# Patient Record
Sex: FEMALE | Race: WHITE | HISPANIC OR LATINO | Employment: UNEMPLOYED | URBAN - METROPOLITAN AREA
[De-identification: names, ages, dates, MRNs, and addresses within clinical notes are randomized per-mention and may not be internally consistent; named-entity substitution may affect disease eponyms.]

---

## 2017-03-16 ENCOUNTER — ALLSCRIPTS OFFICE VISIT (OUTPATIENT)
Dept: OTHER | Facility: OTHER | Age: 52
End: 2017-03-16

## 2017-03-16 DIAGNOSIS — F41.9 ANXIETY DISORDER: ICD-10-CM

## 2017-03-16 DIAGNOSIS — E66.9 OBESITY: ICD-10-CM

## 2017-03-16 DIAGNOSIS — Z00.00 ENCOUNTER FOR GENERAL ADULT MEDICAL EXAMINATION WITHOUT ABNORMAL FINDINGS: ICD-10-CM

## 2017-03-16 DIAGNOSIS — E55.9 VITAMIN D DEFICIENCY: ICD-10-CM

## 2017-03-16 DIAGNOSIS — N25.81 SECONDARY HYPERPARATHYROIDISM OF RENAL ORIGIN (HCC): ICD-10-CM

## 2017-03-16 DIAGNOSIS — I10 ESSENTIAL (PRIMARY) HYPERTENSION: ICD-10-CM

## 2017-03-16 DIAGNOSIS — Z98.84 BARIATRIC SURGERY STATUS: ICD-10-CM

## 2017-03-16 DIAGNOSIS — K91.2 POSTSURGICAL MALABSORPTION, NOT ELSEWHERE CLASSIFIED: ICD-10-CM

## 2017-03-16 DIAGNOSIS — E53.8 DEFICIENCY OF OTHER SPECIFIED B GROUP VITAMINS: ICD-10-CM

## 2017-04-12 ENCOUNTER — APPOINTMENT (OUTPATIENT)
Dept: LAB | Facility: HOSPITAL | Age: 52
End: 2017-04-12
Payer: COMMERCIAL

## 2017-04-12 ENCOUNTER — TRANSCRIBE ORDERS (OUTPATIENT)
Dept: ADMINISTRATIVE | Facility: HOSPITAL | Age: 52
End: 2017-04-12

## 2017-04-12 DIAGNOSIS — K91.2 POSTSURGICAL MALABSORPTION, NOT ELSEWHERE CLASSIFIED: ICD-10-CM

## 2017-04-12 DIAGNOSIS — I10 ESSENTIAL HYPERTENSION, MALIGNANT: ICD-10-CM

## 2017-04-12 DIAGNOSIS — E03.9 UNSPECIFIED HYPOTHYROIDISM: ICD-10-CM

## 2017-04-12 DIAGNOSIS — E55.9 VITAMIN D DEFICIENCY: ICD-10-CM

## 2017-04-12 DIAGNOSIS — I10 ESSENTIAL (PRIMARY) HYPERTENSION: ICD-10-CM

## 2017-04-12 DIAGNOSIS — E66.09 EXOGENOUS OBESITY: ICD-10-CM

## 2017-04-12 DIAGNOSIS — Z98.84 BARIATRIC SURGERY STATUS: ICD-10-CM

## 2017-04-12 DIAGNOSIS — Z79.899 ENCOUNTER FOR LONG-TERM (CURRENT) USE OF OTHER MEDICATIONS: ICD-10-CM

## 2017-04-12 DIAGNOSIS — E66.9 OBESITY: ICD-10-CM

## 2017-04-12 DIAGNOSIS — K59.09 OTHER CONSTIPATION: ICD-10-CM

## 2017-04-12 DIAGNOSIS — E55.9 UNSPECIFIED VITAMIN D DEFICIENCY: ICD-10-CM

## 2017-04-12 DIAGNOSIS — E03.9 UNSPECIFIED HYPOTHYROIDISM: Primary | ICD-10-CM

## 2017-04-12 DIAGNOSIS — F41.9 ANXIETY DISORDER: ICD-10-CM

## 2017-04-12 DIAGNOSIS — Z96.652 PRESENCE OF LEFT ARTIFICIAL KNEE JOINT: ICD-10-CM

## 2017-04-12 DIAGNOSIS — Z96.652 PRESENCE OF LEFT ARTIFICIAL KNEE JOINT: Primary | ICD-10-CM

## 2017-04-12 DIAGNOSIS — N25.81 SECONDARY HYPERPARATHYROIDISM OF RENAL ORIGIN (HCC): ICD-10-CM

## 2017-04-12 DIAGNOSIS — Z00.00 ENCOUNTER FOR GENERAL ADULT MEDICAL EXAMINATION WITHOUT ABNORMAL FINDINGS: ICD-10-CM

## 2017-04-12 DIAGNOSIS — E53.8 DEFICIENCY OF OTHER SPECIFIED B GROUP VITAMINS: ICD-10-CM

## 2017-04-12 LAB
25(OH)D3 SERPL-MCNC: 19.3 NG/ML (ref 30–100)
ALBUMIN SERPL BCP-MCNC: 3.9 G/DL (ref 3.5–5)
ALP SERPL-CCNC: 91 U/L (ref 46–116)
ALT SERPL W P-5'-P-CCNC: 18 U/L (ref 12–78)
ANION GAP SERPL CALCULATED.3IONS-SCNC: 6 MMOL/L (ref 4–13)
AST SERPL W P-5'-P-CCNC: 26 U/L (ref 5–45)
BASOPHILS # BLD AUTO: 0.03 THOUSANDS/ΜL (ref 0–0.1)
BASOPHILS NFR BLD AUTO: 1 % (ref 0–1)
BILIRUB SERPL-MCNC: 0.39 MG/DL (ref 0.2–1)
BUN SERPL-MCNC: 17 MG/DL (ref 5–25)
CALCIUM SERPL-MCNC: 8.9 MG/DL (ref 8.3–10.1)
CHLORIDE SERPL-SCNC: 106 MMOL/L (ref 100–108)
CHOLEST SERPL-MCNC: 166 MG/DL (ref 50–200)
CO2 SERPL-SCNC: 30 MMOL/L (ref 21–32)
CREAT SERPL-MCNC: 0.71 MG/DL (ref 0.6–1.3)
CRP SERPL QL: <3 MG/L
EOSINOPHIL # BLD AUTO: 0.21 THOUSAND/ΜL (ref 0–0.61)
EOSINOPHIL NFR BLD AUTO: 4 % (ref 0–6)
ERYTHROCYTE [DISTWIDTH] IN BLOOD BY AUTOMATED COUNT: 13.2 % (ref 11.6–15.1)
ERYTHROCYTE [SEDIMENTATION RATE] IN BLOOD: 6 MM/HOUR (ref 0–20)
FERRITIN SERPL-MCNC: 30 NG/ML (ref 8–388)
FOLATE SERPL-MCNC: 10.8 NG/ML (ref 3.1–17.5)
GFR SERPL CREATININE-BSD FRML MDRD: >60 ML/MIN/1.73SQ M
GLUCOSE P FAST SERPL-MCNC: 89 MG/DL (ref 65–99)
HCT VFR BLD AUTO: 40.9 % (ref 34.8–46.1)
HDLC SERPL-MCNC: 99 MG/DL (ref 40–60)
HGB BLD-MCNC: 13.7 G/DL (ref 11.5–15.4)
LDLC SERPL CALC-MCNC: 55 MG/DL (ref 0–100)
LYMPHOCYTES # BLD AUTO: 1.43 THOUSANDS/ΜL (ref 0.6–4.47)
LYMPHOCYTES NFR BLD AUTO: 27 % (ref 14–44)
MCH RBC QN AUTO: 28.4 PG (ref 26.8–34.3)
MCHC RBC AUTO-ENTMCNC: 33.5 G/DL (ref 31.4–37.4)
MCV RBC AUTO: 85 FL (ref 82–98)
MONOCYTES # BLD AUTO: 0.32 THOUSAND/ΜL (ref 0.17–1.22)
MONOCYTES NFR BLD AUTO: 6 % (ref 4–12)
NEUTROPHILS # BLD AUTO: 3.3 THOUSANDS/ΜL (ref 1.85–7.62)
NEUTS SEG NFR BLD AUTO: 62 % (ref 43–75)
PLATELET # BLD AUTO: 225 THOUSANDS/UL (ref 149–390)
PMV BLD AUTO: 10.2 FL (ref 8.9–12.7)
POTASSIUM SERPL-SCNC: 3.9 MMOL/L (ref 3.5–5.3)
PROT SERPL-MCNC: 7.1 G/DL (ref 6.4–8.2)
PTH-INTACT SERPL-MCNC: 142.2 PG/ML (ref 14–72)
RBC # BLD AUTO: 4.83 MILLION/UL (ref 3.81–5.12)
SODIUM SERPL-SCNC: 142 MMOL/L (ref 136–145)
T4 FREE SERPL-MCNC: 1 NG/DL (ref 0.76–1.46)
TRIGL SERPL-MCNC: 58 MG/DL
TSH SERPL DL<=0.05 MIU/L-ACNC: 3.84 UIU/ML (ref 0.36–3.74)
VIT B12 SERPL-MCNC: 279 PG/ML (ref 100–900)
WBC # BLD AUTO: 5.29 THOUSAND/UL (ref 4.31–10.16)

## 2017-04-12 PROCEDURE — 82607 VITAMIN B-12: CPT

## 2017-04-12 PROCEDURE — 85652 RBC SED RATE AUTOMATED: CPT

## 2017-04-12 PROCEDURE — 84590 ASSAY OF VITAMIN A: CPT

## 2017-04-12 PROCEDURE — 82746 ASSAY OF FOLIC ACID SERUM: CPT

## 2017-04-12 PROCEDURE — 85025 COMPLETE CBC W/AUTO DIFF WBC: CPT

## 2017-04-12 PROCEDURE — 84425 ASSAY OF VITAMIN B-1: CPT

## 2017-04-12 PROCEDURE — 80061 LIPID PANEL: CPT

## 2017-04-12 PROCEDURE — 82728 ASSAY OF FERRITIN: CPT

## 2017-04-12 PROCEDURE — 84443 ASSAY THYROID STIM HORMONE: CPT

## 2017-04-12 PROCEDURE — 36415 COLL VENOUS BLD VENIPUNCTURE: CPT

## 2017-04-12 PROCEDURE — 84439 ASSAY OF FREE THYROXINE: CPT

## 2017-04-12 PROCEDURE — 83970 ASSAY OF PARATHORMONE: CPT

## 2017-04-12 PROCEDURE — 80053 COMPREHEN METABOLIC PANEL: CPT

## 2017-04-12 PROCEDURE — 82306 VITAMIN D 25 HYDROXY: CPT

## 2017-04-12 PROCEDURE — 86140 C-REACTIVE PROTEIN: CPT

## 2017-04-15 LAB
VIT A SERPL-MCNC: 35 UG/DL (ref 20–65)
VIT B1 BLD-SCNC: 161.5 NMOL/L (ref 66.5–200)

## 2017-05-08 ENCOUNTER — GENERIC CONVERSION - ENCOUNTER (OUTPATIENT)
Dept: OTHER | Facility: OTHER | Age: 52
End: 2017-05-08

## 2017-10-30 ENCOUNTER — GENERIC CONVERSION - ENCOUNTER (OUTPATIENT)
Dept: OTHER | Facility: OTHER | Age: 52
End: 2017-10-30

## 2017-10-30 ENCOUNTER — TRANSCRIBE ORDERS (OUTPATIENT)
Dept: ADMINISTRATIVE | Facility: HOSPITAL | Age: 52
End: 2017-10-30

## 2017-10-30 DIAGNOSIS — R10.13 EPIGASTRIC PAIN: Primary | ICD-10-CM

## 2017-10-30 DIAGNOSIS — R14.0 BLOATING: ICD-10-CM

## 2017-11-09 DIAGNOSIS — R14.0 ABDOMINAL DISTENSION (GASEOUS): ICD-10-CM

## 2017-11-09 DIAGNOSIS — Z98.84 BARIATRIC SURGERY STATUS: ICD-10-CM

## 2017-11-10 ENCOUNTER — APPOINTMENT (OUTPATIENT)
Dept: LAB | Facility: HOSPITAL | Age: 52
End: 2017-11-10
Payer: COMMERCIAL

## 2017-11-10 ENCOUNTER — HOSPITAL ENCOUNTER (OUTPATIENT)
Dept: CT IMAGING | Facility: HOSPITAL | Age: 52
Discharge: HOME | End: 2017-11-10
Payer: COMMERCIAL

## 2017-11-10 DIAGNOSIS — Z98.84 BARIATRIC SURGERY STATUS: ICD-10-CM

## 2017-11-10 DIAGNOSIS — R14.0 ABDOMINAL DISTENSION (GASEOUS): ICD-10-CM

## 2017-11-10 DIAGNOSIS — R10.13 EPIGASTRIC PAIN: ICD-10-CM

## 2017-11-10 DIAGNOSIS — R14.0 BLOATING: ICD-10-CM

## 2017-11-10 LAB
ANION GAP SERPL CALCULATED.3IONS-SCNC: 5 MMOL/L (ref 4–13)
BUN SERPL-MCNC: 23 MG/DL (ref 5–25)
CALCIUM SERPL-MCNC: 9 MG/DL (ref 8.3–10.1)
CHLORIDE SERPL-SCNC: 104 MMOL/L (ref 100–108)
CO2 SERPL-SCNC: 31 MMOL/L (ref 21–32)
CREAT SERPL-MCNC: 0.68 MG/DL (ref 0.6–1.3)
GFR SERPL CREATININE-BSD FRML MDRD: 102 ML/MIN/1.73SQ M
GLUCOSE SERPL-MCNC: 129 MG/DL (ref 65–140)
POTASSIUM SERPL-SCNC: 3.9 MMOL/L (ref 3.5–5.3)
SODIUM SERPL-SCNC: 140 MMOL/L (ref 136–145)

## 2017-11-10 PROCEDURE — 80048 BASIC METABOLIC PNL TOTAL CA: CPT

## 2017-11-10 PROCEDURE — 74177 CT ABD & PELVIS W/CONTRAST: CPT

## 2017-11-10 PROCEDURE — 36415 COLL VENOUS BLD VENIPUNCTURE: CPT

## 2017-11-10 RX ADMIN — IOHEXOL 100 ML: 350 INJECTION, SOLUTION INTRAVENOUS at 12:18

## 2017-11-13 ENCOUNTER — GENERIC CONVERSION - ENCOUNTER (OUTPATIENT)
Dept: OTHER | Facility: OTHER | Age: 52
End: 2017-11-13

## 2017-11-16 ENCOUNTER — GENERIC CONVERSION - ENCOUNTER (OUTPATIENT)
Dept: OTHER | Facility: OTHER | Age: 52
End: 2017-11-16

## 2017-11-27 ENCOUNTER — ANESTHESIA EVENT (OUTPATIENT)
Dept: GASTROENTEROLOGY | Facility: HOSPITAL | Age: 52
End: 2017-11-27
Payer: COMMERCIAL

## 2017-11-29 ENCOUNTER — HOSPITAL ENCOUNTER (OUTPATIENT)
Facility: HOSPITAL | Age: 52
Setting detail: OUTPATIENT SURGERY
Discharge: HOME | End: 2017-11-29
Attending: SURGERY | Admitting: SURGERY
Payer: COMMERCIAL

## 2017-11-29 ENCOUNTER — ANESTHESIA (OUTPATIENT)
Dept: GASTROENTEROLOGY | Facility: HOSPITAL | Age: 52
End: 2017-11-29
Payer: COMMERCIAL

## 2017-11-29 VITALS
RESPIRATION RATE: 16 BRPM | OXYGEN SATURATION: 100 % | HEART RATE: 60 BPM | DIASTOLIC BLOOD PRESSURE: 69 MMHG | TEMPERATURE: 98.3 F | SYSTOLIC BLOOD PRESSURE: 145 MMHG

## 2017-11-29 DIAGNOSIS — Z98.84 BARIATRIC SURGERY STATUS: ICD-10-CM

## 2017-11-29 DIAGNOSIS — R10.13 EPIGASTRIC PAIN: ICD-10-CM

## 2017-11-29 PROCEDURE — 88342 IMHCHEM/IMCYTCHM 1ST ANTB: CPT | Performed by: SURGERY

## 2017-11-29 PROCEDURE — 88305 TISSUE EXAM BY PATHOLOGIST: CPT | Performed by: SURGERY

## 2017-11-29 RX ORDER — SODIUM CHLORIDE 9 MG/ML
125 INJECTION, SOLUTION INTRAVENOUS CONTINUOUS
Status: DISCONTINUED | OUTPATIENT
Start: 2017-11-29 | End: 2017-11-29 | Stop reason: HOSPADM

## 2017-11-29 RX ORDER — DULOXETIN HYDROCHLORIDE 30 MG/1
20 CAPSULE, DELAYED RELEASE ORAL DAILY
COMMUNITY
End: 2018-05-15

## 2017-11-29 RX ORDER — GABAPENTIN 100 MG/1
100 CAPSULE ORAL DAILY
COMMUNITY
End: 2018-05-15

## 2017-11-29 RX ORDER — PRAMIPEXOLE DIHYDROCHLORIDE 0.25 MG/1
0.25 TABLET ORAL 3 TIMES DAILY
Status: ON HOLD | COMMUNITY
End: 2018-06-06 | Stop reason: ALTCHOICE

## 2017-11-29 RX ORDER — NALOXONE HYDROCHLORIDE 4 MG/.1ML
SPRAY NASAL AS NEEDED
COMMUNITY

## 2017-11-29 RX ORDER — PANTOPRAZOLE SODIUM 20 MG/1
20 TABLET, DELAYED RELEASE ORAL DAILY
COMMUNITY
End: 2018-03-22 | Stop reason: SDUPTHER

## 2017-11-29 RX ORDER — PROPOFOL 10 MG/ML
INJECTION, EMULSION INTRAVENOUS AS NEEDED
Status: DISCONTINUED | OUTPATIENT
Start: 2017-11-29 | End: 2017-11-29 | Stop reason: SURG

## 2017-11-29 RX ORDER — MIRTAZAPINE 45 MG/1
45 TABLET, FILM COATED ORAL
COMMUNITY

## 2017-11-29 RX ORDER — GABAPENTIN 400 MG/1
400 CAPSULE ORAL
COMMUNITY
End: 2018-05-15

## 2017-11-29 RX ADMIN — PROPOFOL 100 MG: 10 INJECTION, EMULSION INTRAVENOUS at 08:50

## 2017-11-29 RX ADMIN — LIDOCAINE HYDROCHLORIDE 100 MG: 20 INJECTION, SOLUTION INTRAVENOUS at 08:50

## 2017-11-29 RX ADMIN — SODIUM CHLORIDE 125 ML/HR: 0.9 INJECTION, SOLUTION INTRAVENOUS at 07:40

## 2017-11-29 NOTE — ANESTHESIA PREPROCEDURE EVALUATION
Review of Systems/Medical History  Patient summary reviewed  Chart reviewed  History of anesthetic complications PONV    Cardiovascular  Hypertension controlled,    Pulmonary  Asthma: Last rescue: today Asthma type of rescue: chronic medication usage, ,        GI/Hepatic    GERD well controlled, Bariatric surgery (LRYGB 4 years ago),        Negative  ROS        Endo/Other     GYN  Negative gynecology ROS          Hematology   Musculoskeletal  Obesity , Back pain , chronic back pain and lumbar pain,        Neurology  Negative neurology ROS      Psychology   Anxiety, Depression , being treated for depression,            Physical Exam    Airway    Mallampati score: II  TM Distance: >3 FB  Neck ROM: full     Dental   No notable dental hx     Cardiovascular  Rhythm: regular, Rate: normal, Cardiovascular exam normal    Pulmonary  Pulmonary exam normal Breath sounds clear to auscultation,     Other Findings        Anesthesia Plan  ASA Score- 2       Anesthesia Type- IV sedation with anesthesia with ASA Monitors  Additional Monitors:   Airway Plan:           Induction- intravenous  Informed Consent- Anesthetic plan and risks discussed with patient

## 2017-11-29 NOTE — H&P
This is a 47 y o  female with a history of gastric bypass in 2012  She presents with 2-3 week history of epigastric abdominal pain  Here for an EGD to evaluate the anatomy of the GI tract  Physical Exam    AAOx3  RRR  CTA B  Abdomen obese  Benign  A/P:    This is a 47 y o  female with a history of gastric bypass in 2012  Here for an EGD to rule out a marginal ulceration  Efren Guido MD  11/29/17

## 2017-11-29 NOTE — OP NOTE
[facility]   [address] [facility], [address]  [phone] (Fax)      Operative Report  ESOPHAGOGASTRODUODENOSCOPY (EGD)     Patient Name: Meron Patel    :  1965  MRN: [id number]  Patient Location: [facility] ROOM 01  Surgery Date : 2017  Surgeons:  Surgeon(s) and Role:     * Charan Chowdhury MD - Primary    Diagnosis:    Pre-Op Diagnosis Codes:  Epigastric pain [R10 13]  Bariatric surgery status [Z98 84]    Post-Op Diagnosis Codes:     * Epigastric pain [R10 13]     * Bariatric surgery status [Z98 84]    Procedure  1  Endoscopy with Biopsy    Specimen(s):  ID Type Source Tests Collected by Time Destination   1 : bx stomach pouch, R/O H  Pylori Tissue Stomach TISSUE EXAM Charan Chowdhury MD 2017 [id number]        Estimated Blood Loss:  Minimal      Anesthesia Type:   IV Sedation with Anesthesia    Operative Indications:  Epigastric pain [R10 13]  Bariatric surgery status [Z98 84]    Operative Findings:    Normal gastric bypass anatomy    Complications:   None    Procedure and Technique:        Indication for the procedure     This is a 47 y o  female with a history of gastric bypass in   She presents with 2-3 week history of epigastric abdominal pain  Here for an EGD to evaluate the anatomy of the GI tract      Operative Technique     The patient was brought to the GI suite and was placed in a supine position  EKG trace, pulse, blood pressure and oxygen saturation were monitored throughout the procedure  A time out was called and the patient was identified by name and arm band  The patient was placed in a left lateral decubitus position and received IV sedation with propofol by the anesthesia staff  The endoscope was introduced through the mouth and advanced under under direct visualization  The esophagus was normal in appearance  The gastric pouch was normal in appearance    There was no evidence of marginal ulceration or anastomotic stricture  There was no evidence of gastrogastric fistula either  I was able to pass the scope into the Osmar limb without any difficulty and went down 70 cm distally not finding any abnormalities either  A biopsy was taken from the pouch times two with a forceps grasper to rule out the presence of H  Pylori  The GE junction was again inspected upon withdrawing the scope and was normal in appearance      Impression     Normal anatomy status post gastric bypass  No evidence of marginal ulceration or stricture        Patient Disposition:  PACU     Signature: Geoffrey Casanova MD  Date: November 29, 2017  Time: 8:58 AM

## 2018-01-03 ENCOUNTER — GENERIC CONVERSION - ENCOUNTER (OUTPATIENT)
Dept: OTHER | Facility: OTHER | Age: 53
End: 2018-01-03

## 2018-01-11 NOTE — RESULT NOTES
Discussion/Summary   April 2017 labs reviewed      Your vitamin B12 level is low at 279--goal after gastric surgery is above 400   Low levels can affect your nerve health  Low levels can also lead to anemia and fatigue  Do NOT take GUMMIE multivitamins because they do not contain enough B vitamins  Alcohol intakes can also lower Vitamin b12 levels  As a gastric surgery patient, it is recommended to avoid alcohol intakes  Recommend that you take  an EXTRA 500 mcg of vitamin B12 sublingually (under the tongue) per day  Please keep your routine office visit  If you do not have a scheduled routine appointment, please call the office to schedule it  Our office number is [phone]  If you have questions about your results, this will be discussed with you at your upcoming  visit  If you have gotten your most recent labs after your recent visit and have questions, please call the office  I look forward to seeing you at your next visit  Verified Results  (1) COMPREHENSIVE METABOLIC PANEL [id number] 34:60DJ Jazmin Massing     Test Name Result Flag Reference   SODIUM 142 mmol/L  136-145   POTASSIUM 3 9 mmol/L  3 5-5 3   CHLORIDE 106 mmol/L  100-108   CARBON DIOXIDE 30 mmol/L  21-32   ANION GAP (CALC) 6 mmol/L  4-13   BLOOD UREA NITROGEN 17 mg/dL  5-25   CREATININE 0 71 mg/dL  0 60-1 30   Standardized to IDMS reference method   CALCIUM 8 9 mg/dL  8 3-10 1   BILI, TOTAL 0 39 mg/dL  0 20-1 00   ALK PHOSPHATAS 91 U/L     ALT (SGPT) 18 U/L  12-78   AST(SGOT) 26 U/L  5-45   ALBUMIN 3 9 g/dL  3 5-5 0   TOTAL PROTEIN 7 1 g/dL  6 4-8 2   eGFR Non-African American      >60 0 ml/min/1 73sq m   - Patient Instructions: This is a fasting blood test  Water,black tea or black  coffee only after 9:00pm the night before test Drink 2 glasses of water the morning of test This bloodwork is non-fasting  Please drink two glasses of water morning   ofbloodwork    National Kidney Disease Education Program recommendations are as follows:  GFR calculation is accurate only with a steady state creatinine  Chronic Kidney disease less than 60 ml/min/1 73 sq  meters  Kidney failure less than 15 ml/min/1 73 sq  meters  GLUCOSE FASTING 89 mg/dL  65-99     (1) FERRITIN [id number] 10:30AM Gale Dickens     Test Name Result Flag Reference   FERRITIN 30 ng/mL  5-401     (1) FOLATE [id number] 10:30AM Gale Dickens     Test Name Result Flag Reference   FOLATE 10 8 ng/mL  3 1-17 5     (1) LIPID PANEL, FASTING 53Its9533 10:30AM Gale Dickens     Test Name Result Flag Reference   CHOLESTEROL 166 mg/dL     HDL,DIRECT 99 mg/dL H 40-60   Specimen collection should occur prior to Metamizole administration due to the potential for falsely depressed results  LDL CHOLESTEROL CALCULATED 55 mg/dL  0-100   - Patient Instructions: This is a fasting blood test  Water,black tea or black  coffee only after 9:00pm the night before test   Drink 2 glasses of water the morning of test     - Patient Instructions: This is a fasting blood test  Water,black tea or black  coffee only after 9:00pm the night before test Drink 2 glasses of water the morning of test This bloodwork is non-fasting  Please drink two glasses of water morning   ofbloodwork  Triglyceride:         Normal              <150 mg/dl       Borderline High    150-199 mg/dl       High               200-499 mg/dl       Very High          >499 mg/dl  Cholesterol:         Desirable        <200 mg/dl      Borderline High  200-239 mg/dl      High             >239 mg/dl  HDL Cholesterol:        High    >59 mg/dL      Low     <41 mg/dL  LDL CALCULATED:    This screening LDL is a calculated result  It does not have the accuracy of the Direct Measured LDL in the monitoring of patients with hyperlipidemia and/or statin therapy  Direct Measure LDL (CAT359) must be ordered separately in these patients     TRIGLYCERIDES 58 mg/dL  <=150   Specimen collection should occur prior to N-Acetylcysteine or Metamizole administration due to the potential for falsely depressed results       (1) PTH N-TERMINAL (INTACT) 12Apr2017 10:30AM Gee      Test Name Result Flag Reference   PARATHYROID HORMONE INTACT 142 2 pg/mL H 14 0-72 0     (1) VITAMIN A 12Apr2017 10:30AM Hellen Campbellmer Order Number: [id number]     Test Name Result Flag Reference   VITAMIN A 35 ug/dL  20 - 65   Performed at:  [address]  [id number]  : Derrell Greenwood MD, Phone:  [phone]     (1) VITAMIN B1, WHOLE BLOOD 12Apr2017 10:30AM Hellen Campbellmer Order Number: [id number]     Test Name Result Flag Reference   VITAMIN B1, WHOLE BLOOD 161 5 nmol/L  66 5 - 200 0   Performed at:  [address]  [id number]  : Derrell Greenwood MD, Phone:  [phone]     (1) VITAMIN B12 12Apr2017 10:30AM Gee Oh     Test Name Result Flag Reference   VITAMIN B12 279 pg/mL  100-900

## 2018-01-12 NOTE — MISCELLANEOUS
Message  Reviewed results of US of gallbladder with Dr Ernestina Nails get MRCP  I plan to cancel her EGD and follow-up with me  She was given phone number to schedule her MRI  When she gets appointment scheduled she was advised to call the office to let them know the date and then she needs to schedule follow-up with Dr Hernán Caballero  Advised if she has abdominal pain that worsens with fever she should go to [address] [address] ER as able for evaluation  CR      Signatures   Electronically signed by : SERJIO He;  Apr 28 2016  9:11AM EST                       (Author)

## 2018-01-13 VITALS
WEIGHT: 147 LBS | TEMPERATURE: 98.7 F | SYSTOLIC BLOOD PRESSURE: 140 MMHG | BODY MASS INDEX: 31.71 KG/M2 | HEIGHT: 57 IN | RESPIRATION RATE: 16 BRPM | HEART RATE: 66 BPM | DIASTOLIC BLOOD PRESSURE: 90 MMHG

## 2018-01-13 NOTE — MISCELLANEOUS
Provider Comments  Provider Comments:   Dear Dimple Mcdonald had a scheduled appointment at our office for 09/02/2016 but were unable to keep  We attempted to call you back but were unable to reach you  It is very important that you follow up with us so that we can assess your physical and nutritional safety after your surgery  Please call our office at [phone] to reschedule your appointment         Sincerely,     Kevin Patel [facility]          Signatures   Electronically signed by : Keny Gamez, ; Sep  2 2016 10:03AM EST                       (Author)

## 2018-01-13 NOTE — RESULT NOTES
Verified Results  (1) BASIC METABOLIC PROFILE [id number] 10:40AM Haley Ruth Order Number: [id number]     Test Name Result Flag Reference   GLUCOSE,RANDM 129 mg/dL     If the patient is fasting, the ADA then defines impaired fasting glucose as > 100 mg/dL and diabetes as > or equal to 123 mg/dL  Specimen collection should occur prior to Sulfasalazine administration due to the potential for falsely depressed results  Specimen collection should occur prior to Sulfapyridine administration due to the potential for falsely elevated results  SODIUM 140 mmol/L  136-145   POTASSIUM 3 9 mmol/L  3 5-5 3   CHLORIDE 104 mmol/L  100-108   CARBON DIOXIDE 31 mmol/L  21-32   ANION GAP (CALC) 5 mmol/L  4-13   BLOOD UREA NITROGEN 23 mg/dL  5-25   CREATININE 0 68 mg/dL  0 60-1 30   Standardized to IDMS reference method   CALCIUM 9 0 mg/dL  8 3-10 1   eGFR 102 ml/min/1 73sq [facility] Disease Education Program recommendations are as follows:  GFR calculation is accurate only with a steady state creatinine  Chronic Kidney disease less than 60 ml/min/1 73 sq  meters  Kidney failure less than 15 ml/min/1 73 sq  meters

## 2018-01-14 VITALS
DIASTOLIC BLOOD PRESSURE: 60 MMHG | SYSTOLIC BLOOD PRESSURE: 100 MMHG | BODY MASS INDEX: 31.82 KG/M2 | RESPIRATION RATE: 15 BRPM | HEART RATE: 60 BPM | TEMPERATURE: 98.6 F | HEIGHT: 57 IN | WEIGHT: 147.5 LBS

## 2018-01-15 NOTE — MISCELLANEOUS
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 9) (K83 9)   6  Epigastric abdominal pain (789 06) (R10 13)   7  Fatigue (780 79) (R53 83)   8  Heart burn (787 1) (R12)   9  Hypertension (401 9) (I10)   10  Intestinal malabsorption following gastrectomy (579 3) (K91 2,Z90 3)   11  Iron deficiency anemia (280 9) (D50 9)   12  Iron deficiency anemia secondary to inadequate dietary iron intake (280 1) (D50 8)   13  Low vitamin B12 level (266 2) (E53 8)   14  Pica (307 52) (F50 89)   15  RUQ abdominal pain (789 01) (R10 11)   16  Secondary hyperparathyroidism (588 81) (N25 81)   17  Status post gastric bypass for obesity (V45 86) (Z98 84)   18  Thyroid disorder (246 9) (E07 9)   19  Vitamin B12 deficiency (266 2) (E53 8)   20  Vitamin D deficiency (268 9) (E55 9)    Current Meds   1  Albuterol Sulfate (2 5 MG/3ML) 0 083% Inhalation Nebulization Solution; Therapy: (Eulis Senna) to Recorded   2  Biotin TABS; Therapy: (Anitra ) to Recorded   3  Calcium TABS; Therapy: (Eulis Senna) to Recorded   4  Carafate 1 GM/10ML Oral Suspension; TAKE 2 TEASPOONFULS (10ML) BY MOUTH   FOUR TIMES A DAY; Therapy: 21Apr2016 to (Evaluate:26Eve3334)  Requested for: 19PUR0523; Last   Rx:21Apr2016; Status: ACTIVE - Renewal Denied Ordered   5  Carafate TABS (Sucralfate); Therapy: (Recorded:49Gde0092) to Recorded   6  Flovent Diskus 250 MCG/BLIST Inhalation Aerosol Powder Breath Activated; Therapy: 58XEU6596 to (Evaluate:13Alz7948) Recorded   7   Flovent HFA 44 MCG/ACT Inhalation Aerosol; Therapy: (Nidhi Cuff) to Recorded   8  HydroCHLOROthiazide 12 5 MG Oral Capsule; Therapy: (Recorded:06Rut3867) to Recorded   9  HydrOXYzine HCl - 50 MG Oral Tablet; Therapy: 45PZZ5010 to (Evaluate:57Kpo1701) Recorded   10  Inderal 40 MG TABS (Propranolol HCl); Therapy: (Nidhi Cuff) to Recorded   11  Levothyroxine Sodium 50 MCG Oral Tablet; Therapy: 75MGD1136 to (Evaluate:20Bml0591) Recorded   12  Linzess 145 MCG Oral Capsule; Therapy: 34RPA0278 to (Ziggy Limber) Recorded   13  Lisinopril 40 MG Oral Tablet; Therapy: (Nidhi Cuff) to Recorded   14  Morphine Sulfate 15 MG Oral Tablet Recorded   15  Morphine Sulfate ER 60 MG Oral Tablet Extended Release; Therapy: 84JID0766 to (Evaluate:71Fzk5021) Recorded   16  Multiple Vitamins TABS; Therapy: (Nidhi Cuff) to Recorded   17  Naproxen 500 MG Oral Tablet; Therapy: 26LEI1506 to (Evaluate:58Bos2003) Recorded   18  Nitrofurantoin Macrocrystal 100 MG Oral Capsule; Therapy: 98XUS1319 to (Evaluate:05Tvq2914) Recorded   19  OxyCODONE HCl - 5 MG Oral Tablet; Therapy: (Recorded:17Mar2015) to Recorded   20  Pantoprazole Sodium 20 MG Oral Tablet Delayed Release; TAKE 1 TABLET TWICE    DAILY; Therapy: 39KPF4329 to (Skyla Costello)  Requested for: 85WMD3390; Last    Rx:09Nov2016 Ordered   21  PROzac 20 MG Oral Capsule (FLUoxetine HCl); Therapy: (Recorded:09Nov2016) to Recorded   22  Saphris 10 MG Sublingual Tablet Sublingual;    Therapy: (Recorded:17Mar2015) to Recorded   23  Singulair 10 MG Oral Tablet (Montelukast Sodium); Therapy: (Nidhi Cuff) to Recorded   24  Vitamin D (Ergocalciferol) 66523 UNIT Oral Capsule; Therapy: 49TDW2755 to (Evaluate:86Bax3356) Recorded   25  Vitamin D TABS; Therapy: (Recorded:21Mar2013) to Recorded    Allergies    1   Demerol TABS    Plan  Epigastric abdominal pain, Heart burn, RUQ abdominal pain, Status post gastric bypass  for obesity    · Carafate 1 GM/10ML Oral Suspension; TAKE 10 ML 4 TIMES DAILY    Signatures   Electronically signed by : Paco Franco, ; Dec 23 2016  8:21AM EST                       (Author)

## 2018-01-16 NOTE — RESULT NOTES
Dear Cornelia Castro,   Your test results have returned and are listed below:      Discussion/Summary  Your results show some abnormalities  Your thyroid stimulating hormone is elevated, indicating an underactive thyroid  Your parathyroid hormone is elevated and your vitamin D level is low, which can indicate that you are losing calcium from your bones  Recommend that you see an endocrinologist to address these issues  I have included  a list of endocrinologist with this letter for you to use to set up an appointment  To normalize  your vitamin D level up,  recommend that you take 50,000 IU of vitamin D3( Replesta) once per week for 8 weeks  Lucy Marisela is an over the counter product and a slip is enclosed for you to take to the pharmacy, or it can be purchased on line  In addition, you need to take 1200 to 1500 mg of calcium citrate per day, in divided doses of 500 to 600 mg each  After 8 weeks, switch to a maintenance dose of 4000 to 5000 IU of vitamin D3 per day and continue to take calcium daily  A lab slip is included to recheck your vitamin D, parathyroid level ( PTH) and calcium again in 3 months  Please keep your regularly scheduled follow-up appointment  If you do not have a follow-up scheduled, please call the office to schedule a follow-up visit  If you have any questions, please don't hesitate to call the office     Sincerely,      Signatures   Electronically signed by : LINDA Leach; Aug 26 2016 12:33PM EST                       (Author)    Electronically signed by : Chalmer Apley, M D ; Aug 26 2016  2:00PM EST                       (Validation)

## 2018-01-16 NOTE — MISCELLANEOUS
Message  called patient to discuss findings from CT of abdomen/pelvis  Will await return phone call  CR      Signatures   Electronically signed by : SERJIO Cummings; Nov 16 2017 12:50PM EST                       (Author)

## 2018-01-16 NOTE — RESULT NOTES
Verified Results  * CT ABDOMEN PELVIS W CONTRAST [id number] 10:36AM Petr Lose     Test Name Result Flag Reference   CT ABDOMEN PELVIS W CONTRAST (Report)     CT ABDOMEN AND PELVIS WITH IV CONTRAST     INDICATION: Epigastric pain, bloating following eating for several weeks, history of gastric bypass 4 years ago  COMPARISON: None  TECHNIQUE: CT examination of the abdomen and pelvis was performed  Reformatted images were created in axial, sagittal, and coronal planes  Radiation dose length product (DLP) for this visit: 383 mGy-cm   This examination, like all CT scans performed in the [facility], was performed utilizing techniques to minimize radiation dose exposure, including the use of iterative    reconstruction and automated exposure control  IV Contrast: 100 mL of iohexol (OMNIPAQUE)      Enteric Contrast: Enteric contrast was not administered  FINDINGS:     ABDOMEN     LOWER CHEST: No significant abnormalities identified in the lower chest      LIVER/BILIARY TREE: Unremarkable  GALLBLADDER: Surgically absent  SPLEEN: Unremarkable  PANCREAS: Unremarkable  ADRENAL GLANDS: Unremarkable  KIDNEYS/URETERS: Mild fullness to the right renal collecting system and proximal ureter, possibly related to a enlarged uterus with right-sided tilt  STOMACH AND BOWEL: Postsurgical changes of gastric bypass  No evidence of bowel obstruction  Moderate fecal load throughout the colon  APPENDIX: No findings to suggest appendicitis  ABDOMINOPELVIC CAVITY: No ascites or free intraperitoneal air  No lymphadenopathy  VESSELS: Unremarkable for patient's age  PELVIS     REPRODUCTIVE ORGANS: Mildly enlarged uterus with right-sided tilt, noting 2 cm enhancing region in the anterior fundus, possibly fibroid  URINARY BLADDER: Unremarkable       ABDOMINAL WALL/INGUINAL REGIONS: Surgical clips in the lower abdominal wall, possibly due to prior history of  section  OSSEOUS STRUCTURES: No acute fracture or destructive osseous lesion  Postsurgical changes of L5-S1 fusion  Mild to moderate multilevel degenerative disc disease  IMPRESSION:   1  No acute abdominopelvic findings  Postsurgical changes of gastric bypass and cholecystectomy  2  Enlarged uterus with enhancing 2 cm region in the anterior fundus, possibly fibroid  Pelvic ultrasound can be performed for further evaluation         Workstation performed: [id number]     Signed by:   Cornelius Holter,    11/15/17

## 2018-01-18 NOTE — RESULT NOTES
Dear [facility],   Your test results have returned and are listed below:      Results  (1) COMPREHENSIVE METABOLIC PANEL [id number] 62:80IO Tirso Garcia Kidney Disease Education Program recommendations are as follows:  GFR calculation is accurate only with a steady state creatinine  Chronic Kidney disease less than 60 ml/min/1 73 sq  meters  Kidney failure less than 15 ml/min/1 73 sq  meters  Test Name Result Flag Reference   GLUCOSE,RANDM 96 mg/dL     If the patient is fasting, the ADA then defines impaired fasting glucose as > 100 mg/dL and diabetes as > or equal to 123 mg/dL  SODIUM 144 mmol/L  136-145   POTASSIUM 3 5 mmol/L  3 5-5 3   CHLORIDE 106 mmol/L  100-108   CARBON DIOXIDE 30 mmol/L  21-32   ANION GAP (CALC) 8 mmol/L  4-13   BLOOD UREA NITROGEN 16 mg/dL  5-25   CREATININE 0 76 mg/dL  0 60-1 30   Standardized to IDMS reference method   CALCIUM 8 9 mg/dL  8 3-10 1   BILI, TOTAL 0 37 mg/dL  0 20-1 00   ALK PHOSPHATAS 79 U/L     ALT (SGPT) 16 U/L  12-78   AST(SGOT) 24 U/L  5-45   ALBUMIN 3 8 g/dL  3 5-5 0   TOTAL PROTEIN 6 7 g/dL  6 4-8 2   eGFR Non-African American      >60 0 ml/min/1 73sq m     (1) LIPID PANEL, FASTING [id number] 09:15AM Kimmy Garcia   Triglyceride:         Normal              <150 mg/dl       Borderline High    150-199 mg/dl       High               200-499 mg/dl       Very High          >499 mg/dl  Cholesterol:         Desirable        <200 mg/dl      Borderline High  200-239 mg/dl      High             >239 mg/dl  HDL Cholesterol:        High    >59 mg/dL      Low     <41 mg/dL  LDL CALCULATED:    This screening LDL is a calculated result  It does not have the accuracy of the Direct Measured LDL in the monitoring of patients with hyperlipidemia and/or statin therapy  Direct Measure LDL (SPJ177) must be ordered separately in these patients       Test Name Result Flag Reference   CHOLESTEROL 205 mg/dL H    HDL,DIRECT 93 mg/dL H 40-60   LDL CHOLESTEROL CALCULATED 99 mg/dL  0-100   TRIGLYCERIDES 65 mg/dL  <=150     (1) VITAMIN B12 [id number] 09:15AM Karime Garcia     Test Name Result Flag Reference   VITAMIN B12 329 pg/mL  100-900     (1) FERRITIN [id number] 09:15AM Veronique Bueno     Test Name Result Flag Reference   FERRITIN 61 ng/mL  8-388     (1) FOLATE [id number] 09:15AM Veronique Bueno     Test Name Result Flag Reference   FOLATE 14 7 ng/mL  3 1-17 5     (1) IRON SATURATION %, TIBC [id number] 09:15AM Karime Garcia     Test Name Result Flag Reference   IRON SATURATION 35 %     TOTAL IRON BINDING CAPACITY 298 ug/dL  250-450   IRON 104 ug/dL       (1) PTH N-TERMINAL (INTACT) [id number] 09:15AM Karime Garcia     Test Name Result Flag Reference   PARATHYROID HORMONE INTACT 139 7 pg/mL H 14 0-72 0     (1) VITAMIN D 25-HYDROXY [id number] 09:15AM Karime Garcia     Test Name Result Flag Reference   VIT D 25-HYDROX 9 9 ng/mL L 30 0-100 0       Plan  Health Maintenance, Malabsorption syndrome, Secondary  hyperparathyroidism, Status post gastric bypass for  obesity    · (1) CALCIUM; Status:Active; Requested for:[id number];    · (1) PTH N-TERMINAL (INTACT); Status:Active; Requested  for:[id number];    · (1) VITAMIN D 25-HYDROXY; Status:Active; Requested  for:[id number];     Discussion/Summary  Your results show some abnormalities  2/19/16 labs reviewed  Your total cholesterol is a little high at 205, but your good cholesterol is high at 93-so this looks good overall  Please also review these labs with your PCP when you have a routine visit  Your PTH is very high at 139 7  Your parathyroid hormone (PTH) is elevated, which can indicate that you are losing calcium from your bones  Recommend that you take   1500 mg of calcium per day  Calcium needs to be taken in divided doses of 500 to 600 mg each  A lab slip is enclosed to recheck your levels again in three months       Your vitamin D is also very low at 9 9-Your vitamin D level is low which can affect your bone health  Recommend that you take 50,000 IU of vitamin D3 (Replesta) twice per week for 8 weeks  Replesta is an over the counter product and a slip is enclosed to take to the pharmacy, or it can be purchased on line  If you cannot afford the Replesta, I can order vitamin d2 for you in the same dose-call and leave a message on our clinical line if you need this or talk to our nurse and I will order this for you  IMPORTANT  After 8 weeks, switch to a maintenance dose of 1203-2978 IU vitamin D3 per day (this includes the vitamin D in all your supplements combined) and continue to take calcium daily  Your level will be checked again in 3 months    Your vitamin b12 level is low at 329-we want this > 400 after gastric surgery-Your vitamin B12 level is low, which can affect your nerve health  Recommend that you take an extra  500 -1000mcg of vitamin B12 sublingually (under the tongue) per day  Alcohol intakes can lower B vitamin levels  As a gastric surgery patient it is recommended to avoid all alcohol  If you have any questions, please don't hesitate to call the office     Sincerely,      Signatures   Electronically signed by : Brayan Saenz, [facility]; Feb 19 2016  4:36PM EST                       (Author)    Electronically signed by : VICKI Daniel ; Feb 25 2016 11:51AM EST                       (Validation)

## 2018-01-23 NOTE — MISCELLANEOUS
Provider Comments  Provider Comments:   Dear Steve Santana had a scheduled appointment at our office for 01/03/2018 but were unable to keep  We attempted to call you back but were unable to reach you  It is very important that you follow up with us so that we can assess your physical and nutritional safety after your surgery  Please call our office at [phone] to reschedule your appointment         Sincerely,     Kevin Patel [facility]          Signatures   Electronically signed by : Marycruz Merchant, ; Fermín  3 2018 11:43AM EST                       (Author)

## 2018-03-12 RX ORDER — SUCRALFATE 1 G/1
1 TABLET ORAL 4 TIMES DAILY
COMMUNITY
Start: 2017-10-30 | End: 2018-05-17 | Stop reason: ALTCHOICE

## 2018-03-12 RX ORDER — BUTALBITAL/ASPIRIN/CAFFEINE 50-325-40
CAPSULE ORAL
COMMUNITY

## 2018-03-12 RX ORDER — ERGOCALCIFEROL 1.25 MG/1
CAPSULE ORAL
COMMUNITY
Start: 2016-11-03

## 2018-03-12 RX ORDER — HYDROXYZINE PAMOATE 50 MG/1
CAPSULE ORAL
COMMUNITY
Start: 2017-02-11

## 2018-03-12 RX ORDER — OXYCODONE HCL 5 MG/5 ML
SOLUTION, ORAL ORAL
COMMUNITY

## 2018-03-12 RX ORDER — FLUTICASONE PROPIONATE 44 UG/1
AEROSOL, METERED RESPIRATORY (INHALATION)
COMMUNITY

## 2018-03-22 DIAGNOSIS — Z98.84 STATUS POST GASTRIC BYPASS FOR OBESITY: Primary | ICD-10-CM

## 2018-03-22 RX ORDER — PANTOPRAZOLE SODIUM 20 MG/1
20 TABLET, DELAYED RELEASE ORAL DAILY
Qty: 30 TABLET | Refills: 1 | Status: SHIPPED | OUTPATIENT
Start: 2018-03-22 | End: 2018-05-15

## 2018-05-15 RX ORDER — FLUVOXAMINE MALEATE 100 MG
TABLET ORAL
COMMUNITY

## 2018-05-15 RX ORDER — BACLOFEN 20 MG/1
TABLET ORAL
COMMUNITY
Start: 2018-02-02

## 2018-05-15 RX ORDER — ESTRADIOL 2 MG/1
TABLET ORAL
COMMUNITY

## 2018-05-15 RX ORDER — ESOMEPRAZOLE MAGNESIUM 20 MG/1
FOR SUSPENSION ORAL
Status: ON HOLD | COMMUNITY
Start: 2018-05-04 | End: 2018-06-06 | Stop reason: ALTCHOICE

## 2018-05-17 ENCOUNTER — OFFICE VISIT (OUTPATIENT)
Dept: BARIATRICS | Facility: CLINIC | Age: 53
End: 2018-05-17
Payer: COMMERCIAL

## 2018-05-17 VITALS
HEIGHT: 57 IN | WEIGHT: 148 LBS | DIASTOLIC BLOOD PRESSURE: 90 MMHG | HEART RATE: 78 BPM | TEMPERATURE: 98.2 F | SYSTOLIC BLOOD PRESSURE: 126 MMHG | BODY MASS INDEX: 31.93 KG/M2

## 2018-05-17 DIAGNOSIS — I10 ESSENTIAL HYPERTENSION: ICD-10-CM

## 2018-05-17 DIAGNOSIS — E66.9 OBESITY (BMI 30-39.9): ICD-10-CM

## 2018-05-17 DIAGNOSIS — E53.8 LOW VITAMIN B12 LEVEL: ICD-10-CM

## 2018-05-17 DIAGNOSIS — Z98.84 BARIATRIC SURGERY STATUS: Primary | ICD-10-CM

## 2018-05-17 DIAGNOSIS — K91.2 POSTSURGICAL MALABSORPTION: ICD-10-CM

## 2018-05-17 DIAGNOSIS — R10.13 EPIGASTRIC ABDOMINAL PAIN: ICD-10-CM

## 2018-05-17 DIAGNOSIS — E55.9 VITAMIN D DEFICIENCY: ICD-10-CM

## 2018-05-17 DIAGNOSIS — N25.81 SECONDARY HYPERPARATHYROIDISM (HCC): ICD-10-CM

## 2018-05-17 PROBLEM — M25.562 CHRONIC PAIN OF BOTH KNEES: Status: ACTIVE | Noted: 2017-12-27

## 2018-05-17 PROBLEM — R13.13 CRICOPHARYNGEAL DYSPHAGIA: Status: ACTIVE | Noted: 2017-10-30

## 2018-05-17 PROBLEM — F51.04 PSYCHOPHYSIOLOGICAL INSOMNIA: Status: ACTIVE | Noted: 2017-10-25

## 2018-05-17 PROBLEM — M79.7 FIBROMYALGIA: Status: ACTIVE | Noted: 2017-08-11

## 2018-05-17 PROBLEM — Z79.891 LONG-TERM CURRENT USE OF OPIATE ANALGESIC: Status: ACTIVE | Noted: 2017-08-11

## 2018-05-17 PROBLEM — G89.4 CHRONIC PAIN ASSOCIATED WITH SIGNIFICANT PSYCHOSOCIAL DYSFUNCTION: Status: ACTIVE | Noted: 2018-02-05

## 2018-05-17 PROBLEM — R73.03 PREDIABETES: Status: ACTIVE | Noted: 2017-10-02

## 2018-05-17 PROBLEM — M25.561 CHRONIC PAIN OF BOTH KNEES: Status: ACTIVE | Noted: 2017-12-27

## 2018-05-17 PROBLEM — M54.16 RADICULOPATHY, LUMBAR REGION: Status: ACTIVE | Noted: 2017-09-19

## 2018-05-17 PROBLEM — G89.29 CHRONIC PAIN OF BOTH KNEES: Status: ACTIVE | Noted: 2017-12-27

## 2018-05-17 PROCEDURE — 99214 OFFICE O/P EST MOD 30 MIN: CPT | Performed by: PHYSICIAN ASSISTANT

## 2018-05-17 RX ORDER — SUCRALFATE ORAL 1 G/10ML
1 SUSPENSION ORAL 4 TIMES DAILY
Qty: 420 ML | Refills: 0 | Status: SHIPPED | OUTPATIENT
Start: 2018-05-17

## 2018-05-17 NOTE — ASSESSMENT & PLAN NOTE
-s/p Osmar-En-Y Gastric Bypass with Dr Hillery Duane on 2/13/2012  She also has had laparoscopic cholecystectomy by Dr Hillery Duane 6/17/2016  Overall doing Fairly well with weight loss-weight has been stable over the past couple of years    Initial:192 lb  Current: 148 lb  EWL: 68%  Ronni:  Current BMI is Body mass index is 32 03 kg/m²      Tolerating a regular diet-fair- with abdominal pain intermittently  Eating at least 60 grams of protein per day-yes  Following 30/60 minute rule with liquids-yes  Drinking at least 64 ounces of fluid per day-yes  Drinking carbonated beverages-no  Sufficient exercise-limited  Using NSAIDs regularly-no  Using nicotine-no/some second-hand smoke exposure by significant other  Using alcohol-no

## 2018-05-17 NOTE — ASSESSMENT & PLAN NOTE
pth has remained high -likely related to her non-compliance with supplements  Advised her of importance of taking all supplements as advised  She is already established with endocrinologist and notes she has appointment with Dr Ling Schaeffer later today  I  Will recheck her pth, calcium and Vitamin d with her routine labs now but advised I want her to review her pth levels with him today as she may need further evaluation of the gland as well  She verbalized understanding   I provided her with pth levels from last 3 readings to share with her endocrinologist

## 2018-05-17 NOTE — ASSESSMENT & PLAN NOTE
Clover Stands returns today for her routine annual follow-up  She is status post Osmar-en-y gastric byppsss surgery 2/13/12 by Dr Shirley Mendoza and has had laparoscopic cholecystectomy 5/17/16 by Dr Shirley Mendoza  She had CT of abdomen/pelvis at the end of October 2017 for complaints of abdominal pain and bloating and also had upper endoscopy in November 2017 for epigastric burning pain  CT of abdomen/pelvis was essentially unrevealing for acute cause of her pain but did show moderate stool  She had enlarged uterus with possible fibroid and did follow-up with GYN for same and reports she does have fibroid  She had upper endoscopy for the burning epigastric abdominal pain in November and results were within normal limits and biopsy was benign with no evidence of h pylori    She notes over the past couple months mid-epigastric burning  abdominal pain has returned  Her PCP switched her ppi to nexium which she is taking daily  I had given her carafate prior to her upper endoscopy and she reports she continues to take this but notes she has been swallowing the pills whole  She complains of "choking on her phlegm" with vomiting up phlegm around once /week  She also notes for around 4 days she held her other medication because even taking her medication she is complaining of burning pain -but generally this is worse with meals  She has discussed same with her pcp  No fever,chills, nausea or vomiting  She notes last night she had epigastric pain for around 2 hours  She has had problems with constipation in the past-on linzess and now notes her stools are looser  Her PCP has referred her to gastroenterologist but patient prefers that we evaluate her stomach  She has some second-hand smoke exposure from her significant other  She denies NSAID or steroid use  She denies alcohol  She does have some caffeine use-encouraged to limit this as well  She is on chronic pain medication      On exam she was tender to palpation to mid-epigastric region without rebound or guarding    PLAN; Advised to increase her nexium to twice daily  Advised on taking carafate as a slurry 4 times per day  Recommended she add  A daily probiotic  Will repeat upper endoscopy to rule out ulcer  She will be seen in the office a couple weeks after upper endoscopy is completed    I encouraged her to keep follow-up with GI as well    Advised on importance of avoidance of second-hand smoke

## 2018-05-17 NOTE — PROGRESS NOTES
Assessment/Plan:    Epigastric abdominal pain  Siria returns today for her routine annual follow-up  She is status post Osmar-en-y gastric byppsss surgery 2/13/12 by Dr Hernán Caballero and has had laparoscopic cholecystectomy 5/17/16 by Dr Hernán Caballero  She had CT of abdomen/pelvis at the end of October 2017 for complaints of abdominal pain and bloating and also had upper endoscopy in November 2017 for epigastric burning pain  CT of abdomen/pelvis was essentially unrevealing for acute cause of her pain but did show moderate stool  She had enlarged uterus with possible fibroid and did follow-up with GYN for same and reports she does have fibroid  She had upper endoscopy for the burning epigastric abdominal pain in November and results were within normal limits and biopsy was benign with no evidence of h pylori    She notes over the past couple months mid-epigastric burning  abdominal pain has returned  Her PCP switched her ppi to nexium which she is taking daily  I had given her carafate prior to her upper endoscopy and she reports she continues to take this but notes she has been swallowing the pills whole  She complains of "choking on her phlegm" with vomiting up phlegm around once /week  She also notes for around 4 days she held her other medication because even taking her medication she is complaining of burning pain -but generally this is worse with meals  She has discussed same with her pcp  No fever,chills, nausea or vomiting  She notes last night she had epigastric pain for around 2 hours  She has had problems with constipation in the past-on linzess and now notes her stools are looser  Her PCP has referred her to gastroenterologist but patient prefers that we evaluate her stomach  She has some second-hand smoke exposure from her significant other  She denies NSAID or steroid use  She denies alcohol  She does have some caffeine use-encouraged to limit this as well    She is on chronic pain medication  On exam she was tender to palpation to mid-epigastric region without rebound or guarding    PLAN; Advised to increase her nexium to twice daily  Advised on taking carafate as a slurry 4 times per day  Recommended she add  A daily probiotic  Will repeat upper endoscopy to rule out ulcer  She will be seen in the office a couple weeks after upper endoscopy is completed    I encouraged her to keep follow-up with GI as well  Advised on importance of avoidance of second-hand smoke        Bariatric surgery status  -s/p Osmar-En-Y Gastric Bypass with Dr Farnaz Marcial on 2/13/2012  She also has had laparoscopic cholecystectomy by Dr Farnaz Marcial 6/17/2016  Overall doing Fairly well with weight loss-weight has been stable over the past couple of years    Initial:192 lb  Current: 148 lb  EWL: 68%  Ronni:  Current BMI is Body mass index is 32 03 kg/m²      Tolerating a regular diet-fair- with abdominal pain intermittently  Eating at least 60 grams of protein per day-yes  Following 30/60 minute rule with liquids-yes  Drinking at least 64 ounces of fluid per day-yes  Drinking carbonated beverages-no  Sufficient exercise-limited  Using NSAIDs regularly-no  Using nicotine-no/some second-hand smoke exposure by significant other  Using alcohol-no    Postsurgical malabsorption  -At risk for malabsorption of vitamins/minerals secondary to malabsorption and restriction of intake from their procedure  -Currently taking adequate postop bariatric surgery vitamin supplementation-no  -Last set of bariatric labs completed on 4/17 and are not within acceptable limits   -Next set of bariatric labs ordered for approximately 2 weeks     She has been only taking one regular multivitamin supplement daily-advised on importance of adequate supplements to avoid deficiency and long-term associated medical problems    Recommended that she try one [employer] multivitamin/mineral supplement with 18 mg of iron and 500 mg calcium citrate with D three times per day      Secondary hyperparathyroidism ([facility] [address] )  pth has remained high -likely related to her non-compliance with supplements  Advised her of importance of taking all supplements as advised  She is already established with endocrinologist and notes she has appointment with Dr Verito Doran later today  I  Will recheck her pth, calcium and Vitamin d with her routine labs now but advised I want her to review her pth levels with him today as she may need further evaluation of the gland as well  She verbalized understanding  I provided her with pth levels from last 3 readings to share with her endocrinologist     Hypertension  She is ordered on antihypertensive medication  Advised on importance of taking medication as prescribed by her providers  Diastolic was high normal today  Followed by pcp    Low vitamin B12 level  Last vitamin B12 level was low-advised to trial SpectraLinear which contains extra vitamin [id number] await labs for further advice       Diagnoses and all orders for this visit:    Bariatric surgery status    Postsurgical malabsorption    Secondary hyperparathyroidism ([facility] [address] )    Vitamin D deficiency    Obesity (BMI 30-39  9)    Low vitamin B12 level    Epigastric abdominal pain  -     sucralfate (CARAFATE) 1 g/10 mL suspension; Take 10 mL (1 g total) by mouth 4 (four) times a day    Essential hypertension          Subjective:      Patient ID: Gustavo Christianson is a 47 y o  female  She is here for routine annual visit  She is complaining of mid-epigastric burning pain worse with meals/intermittent  No associated fever,chills or nausea  She vomits "phelgm" at times  She is prone to constipation-on linzess now and notes stools tend to be loose now  She notes she has follow-up with Gastroenterologist in June as well          The following portions of the patient's history were reviewed and updated as appropriate: allergies, current medications, past family history, past medical history, past social history, past surgical history and problem list     Review of Systems   Constitutional: Negative for chills, fever and unexpected weight change (weight is stable)  Respiratory: Negative for shortness of breath and wheezing  Cardiovascular: Negative for chest pain and palpitations  Gastrointestinal: Negative for abdominal pain, constipation, diarrhea, nausea and vomiting  Psychiatric/Behavioral: Suicidal ideas: no complait of anxiety or depression  Objective:      /90   Pulse 78   Temp 98 2 °F (36 8 °C)   Ht 4' 9" (1 448 m)   Wt 67 1 kg (148 lb)   BMI 32 03 kg/m²          Physical Exam   Constitutional: She is oriented to person, place, and time  She appears well-developed and well-nourished  HENT:   Mouth/Throat: Oropharynx is clear and moist    Eyes: Conjunctivae are normal  No scleral icterus  Cardiovascular: Normal rate and regular rhythm  Pulmonary/Chest: Effort normal and breath sounds normal    Abdominal: Soft  Bowel sounds are normal  She exhibits no distension  There is tenderness  There is no rebound and no guarding  No incisional hernias appreciated  + tenderness to palpation to mid-epigastric region   Musculoskeletal:   Normal gait   Neurological: She is alert and oriented to person, place, and time  Psychiatric: She has a normal mood and affect  Her behavior is normal  Judgment and thought content normal    Nursing note and vitals reviewed  GOALS:Maintain +/- Continued weight loss with good nutrition intakes    Normal vitamin and mineral levels  Exercise as tolerated    BARRIERS: none identified

## 2018-05-17 NOTE — ASSESSMENT & PLAN NOTE
She is ordered on antihypertensive medication  Advised on importance of taking medication as prescribed by her providers  Diastolic was high normal today    Followed by pcp

## 2018-05-17 NOTE — PATIENT INSTRUCTIONS
Take nexium 20 mg twice a day for now-before breakfast and before dinner day  Take the liquid carafate before meals and bedtime  Also keep follow-up with  gastroenterologist  We will schedule repeat upper endoscopy  And then make follow-up with Dr Munir Cabrera after upper endoscopy  Recommend that you add an over-the-counter probitotic-this is over-the-counter  Get one that has lactobacillus in it  Take once daily  Review previous parathyroid hormone levels with Dr Enrike Suresh may need to have this further evaluated by him  Get Gastroenterology evaluation as well    Follow-up in one year with me  We kindly ask that you arrive 15 minutes before your scheduled appointment time with your provider to allow you to be roomed, have your vital signs checked and your chart updated by our staff  We appreciate your patience during your visit    Follow diet as discussed  Follow  vitamin and mineral recommendations as reviewed with you  Exercise as tolerated    If you have gotten a lab slip at this visit, please note that most labs are FASTING-but you need to drink water the night before and the morning before your labs are done  It is HIGHLY RECOMMENDED that you check with your insurance to make sure all the labs ordered are covered by your individual insurance policy  This is especially important if you also get labs done by other providers outside of [facility]  You want to avoid having duplicate labs done  Note you will be given a lab slip AFTER  your annual visit next year to check your vitamin and mineral levels  You will not need to make a second appointment after the labs are received/reviewed  You will receive a letter/and or phone call with your results  Most labs do NOT honor a lab slip dated one year in advance now  Call our office if he have any problems with abdominal pain especially if associated with fever, chills, nausea, vomiting or any other concerns      All  Post-bariatric surgery patients should be aware that very small quantities of any alcohol  can cause impairment and it is very possible not to feel the effect  The effect can be in the system for several hours  It is also a stomach irritant  It is advised to AVOID alcohol, Nonsteroidal antiinflammatory drugs (NSAIDS) and nicotine of all forms   Any of these can cause stomach irritation/pain

## 2018-06-05 ENCOUNTER — ANESTHESIA EVENT (OUTPATIENT)
Dept: GASTROENTEROLOGY | Facility: HOSPITAL | Age: 53
End: 2018-06-05
Payer: COMMERCIAL

## 2018-06-06 ENCOUNTER — HOSPITAL ENCOUNTER (OUTPATIENT)
Facility: HOSPITAL | Age: 53
Setting detail: OUTPATIENT SURGERY
Discharge: HOME | End: 2018-06-06
Attending: SURGERY | Admitting: SURGERY
Payer: COMMERCIAL

## 2018-06-06 ENCOUNTER — ANESTHESIA (OUTPATIENT)
Dept: GASTROENTEROLOGY | Facility: HOSPITAL | Age: 53
End: 2018-06-06
Payer: COMMERCIAL

## 2018-06-06 VITALS
WEIGHT: 145 LBS | DIASTOLIC BLOOD PRESSURE: 80 MMHG | RESPIRATION RATE: 16 BRPM | HEIGHT: 59 IN | TEMPERATURE: 99.3 F | HEART RATE: 75 BPM | BODY MASS INDEX: 29.23 KG/M2 | SYSTOLIC BLOOD PRESSURE: 141 MMHG | OXYGEN SATURATION: 99 %

## 2018-06-06 DIAGNOSIS — R10.13 EPIGASTRIC ABDOMINAL PAIN: ICD-10-CM

## 2018-06-06 DIAGNOSIS — Z98.84 BARIATRIC SURGERY STATUS: ICD-10-CM

## 2018-06-06 LAB — GLUCOSE SERPL-MCNC: 110 MG/DL (ref 65–140)

## 2018-06-06 PROCEDURE — 88342 IMHCHEM/IMCYTCHM 1ST ANTB: CPT | Performed by: PATHOLOGY

## 2018-06-06 PROCEDURE — 88305 TISSUE EXAM BY PATHOLOGIST: CPT | Performed by: PATHOLOGY

## 2018-06-06 PROCEDURE — 43239 EGD BIOPSY SINGLE/MULTIPLE: CPT | Performed by: SURGERY

## 2018-06-06 PROCEDURE — 82948 REAGENT STRIP/BLOOD GLUCOSE: CPT

## 2018-06-06 RX ORDER — MELOXICAM 7.5 MG/1
7.5 TABLET ORAL DAILY
COMMUNITY

## 2018-06-06 RX ORDER — AMLODIPINE BESYLATE 10 MG/1
10 TABLET ORAL DAILY
COMMUNITY

## 2018-06-06 RX ORDER — FLUTICASONE PROPIONATE 50 MCG
2 SPRAY, SUSPENSION (ML) NASAL DAILY
COMMUNITY

## 2018-06-06 RX ORDER — PROPOFOL 10 MG/ML
INJECTION, EMULSION INTRAVENOUS AS NEEDED
Status: DISCONTINUED | OUTPATIENT
Start: 2018-06-06 | End: 2018-06-06 | Stop reason: SURG

## 2018-06-06 RX ORDER — LANSOPRAZOLE 15 MG/1
15 CAPSULE, DELAYED RELEASE ORAL DAILY
COMMUNITY

## 2018-06-06 RX ORDER — SODIUM CHLORIDE 9 MG/ML
125 INJECTION, SOLUTION INTRAVENOUS CONTINUOUS
Status: DISCONTINUED | OUTPATIENT
Start: 2018-06-06 | End: 2018-06-06 | Stop reason: HOSPADM

## 2018-06-06 RX ORDER — MEDROXYPROGESTERONE ACETATE 2.5 MG/1
5 TABLET ORAL DAILY
COMMUNITY

## 2018-06-06 RX ADMIN — PROPOFOL 40 MG: 10 INJECTION, EMULSION INTRAVENOUS at 08:00

## 2018-06-06 RX ADMIN — PROPOFOL 120 MG: 10 INJECTION, EMULSION INTRAVENOUS at 07:55

## 2018-06-06 RX ADMIN — SODIUM CHLORIDE 125 ML/HR: 0.9 INJECTION, SOLUTION INTRAVENOUS at 07:08

## 2018-06-06 RX ADMIN — LIDOCAINE HYDROCHLORIDE 80 MG: 20 INJECTION, SOLUTION INTRAVENOUS at 07:55

## 2018-06-06 NOTE — ANESTHESIA PREPROCEDURE EVALUATION
Review of Systems/Medical History  Patient summary reviewed  Chart reviewed  History of anesthetic complications PONV    Cardiovascular  Hyperlipidemia, Hypertension controlled,    Pulmonary  Asthma Last rescue: today Asthma type of rescue: chronic medication usage, Sleep apnea ,        GI/Hepatic    GERD well controlled, Bariatric surgery (LRYGB 4 years ago),        Negative  ROS Kidney stones,        Endo/Other  Diabetes type 2 Oral agent, History of thyroid disease , hypothyroidism,   Obesity    GYN  Negative gynecology ROS          Hematology   Musculoskeletal  Back pain , lumbar pain,        Neurology  Negative neurology ROS      Psychology   Anxiety, Depression , bipolar disorder and being treated for depression,              Physical Exam    Airway    Mallampati score: II  TM Distance: >3 FB  Neck ROM: full     Dental   No notable dental hx     Cardiovascular  Rhythm: regular, Rate: normal, Cardiovascular exam normal    Pulmonary  Pulmonary exam normal Breath sounds clear to auscultation,     Other Findings        Anesthesia Plan  ASA Score- 3     Anesthesia Type- IV sedation with anesthesia with ASA Monitors  Additional Monitors:   Airway Plan:         Plan Factors- Patient instructed to abstain from smoking on day of procedure  Patient did not smoke on day of surgery  Induction- intravenous  Postoperative Plan-     Informed Consent- Anesthetic plan and risks discussed with patient

## 2018-06-06 NOTE — OP NOTE
[facility]   [address] [facility], [address]  [phone] (Fax)      Operative Report  ESOPHAGOGASTRODUODENOSCOPY (EGD) with bx     Patient Name: Ángel Meeks    :  1965  MRN: [id number]  Patient Location: [facility] ROOM 01  Surgery Date : 2018  Surgeons:  Surgeon(s) and Role:     Rufus Waters MD - Primary    Diagnosis:    Pre-Op Diagnosis Codes:  Epigastric abdominal pain [R10 13]  Bariatric surgery status [Z98 84]    Post-Op Diagnosis Codes:     * Epigastric abdominal pain [R10 13]     * Bariatric surgery status [Z98 84]     * Pouchitis    Procedure  1  Endoscopy with Biopsy    Specimen(s):  ID Type Source Tests Collected by Time Destination   1 : gastric pouch bx, R/O H  Pylori Tissue Stomach TISSUE EXAM Roberto Carlos Tran MD 2018 0801        Estimated Blood Loss:    Minimal      Anesthesia Type:     IV Sedation with Anesthesia    Operative Indications:    Epigastric abdominal pain [R10 13]  Bariatric surgery status [Z98 84]      Operative Findings:    Pouchitis    Complications:     None    Procedure and Technique:       Indication for the procedure     The patient is a 47 y o  female  status post Osmar-en-y gastric byppsss surgery in 12  and has had laparoscopic cholecystectomy 16  Had an endoscopy last October the revealed a normal anatomy  Her symptomatology has recurred over the last 2 or 3 months and she has been scheduled to have an endoscopy to evaluate the anatomy of her gastric bypass  Operative Technique     The patient was brought to the GI suite and was placed in a supine position  EKG trace, pulse, blood pressure and oxygen saturation were monitored throughout the procedure  A time out was called and the patient was identified by name and arm band  The patient was placed in a left lateral decubitus position and received IV sedation with propofol by the anesthesia staff    The endoscope was introduced through the mouth and advanced under under direct visualization  The esophagus was normal in appearance  The gastric pouch showed evidence of  inflammation  There were no mucosal lesions, polyps nor ulcerations    There was no evidence of gastrojejunal anastomotic stricture  I was able to pass the scope into the Osmra limb with no difficulty and went down 70 cm distal not find any abnormalities either  A biopsy was taken from the pouch times two with a forceps grasper to rule out the presence of H  Pylori       The GE junction was again inspected upon withdrawing the scope and was normal in appearance      Impression      Pouchitis        Patient Disposition:    PACU     Signature: Chalmer Apley, MD  Date: June 6, 2018  Time: 8:02 AM

## 2018-06-06 NOTE — H&P (VIEW-ONLY)
Assessment/Plan:    Epigastric abdominal pain  Siria returns today for her routine annual follow-up  She is status post Osmar-en-y gastric byppsss surgery 2/13/12 by Dr Angela Pena and has had laparoscopic cholecystectomy 5/17/16 by Dr Angela Pena  She had CT of abdomen/pelvis at the end of October 2017 for complaints of abdominal pain and bloating and also had upper endoscopy in November 2017 for epigastric burning pain  CT of abdomen/pelvis was essentially unrevealing for acute cause of her pain but did show moderate stool  She had enlarged uterus with possible fibroid and did follow-up with GYN for same and reports she does have fibroid  She had upper endoscopy for the burning epigastric abdominal pain in November and results were within normal limits and biopsy was benign with no evidence of h pylori    She notes over the past couple months mid-epigastric burning  abdominal pain has returned  Her PCP switched her ppi to nexium which she is taking daily  I had given her carafate prior to her upper endoscopy and she reports she continues to take this but notes she has been swallowing the pills whole  She complains of "choking on her phlegm" with vomiting up phlegm around once /week  She also notes for around 4 days she held her other medication because even taking her medication she is complaining of burning pain -but generally this is worse with meals  She has discussed same with her pcp  No fever,chills, nausea or vomiting  She notes last night she had epigastric pain for around 2 hours  She has had problems with constipation in the past-on linzess and now notes her stools are looser  Her PCP has referred her to gastroenterologist but patient prefers that we evaluate her stomach  She has some second-hand smoke exposure from her significant other  She denies NSAID or steroid use  She denies alcohol  She does have some caffeine use-encouraged to limit this as well    She is on chronic pain medication  On exam she was tender to palpation to mid-epigastric region without rebound or guarding    PLAN; Advised to increase her nexium to twice daily  Advised on taking carafate as a slurry 4 times per day  Recommended she add  A daily probiotic  Will repeat upper endoscopy to rule out ulcer  She will be seen in the office a couple weeks after upper endoscopy is completed    I encouraged her to keep follow-up with GI as well  Advised on importance of avoidance of second-hand smoke        Bariatric surgery status  -s/p Osmar-En-Y Gastric Bypass with Dr Shiv Goldberg on 2/13/2012  She also has had laparoscopic cholecystectomy by Dr Shiv Goldberg 6/17/2016  Overall doing Fairly well with weight loss-weight has been stable over the past couple of years    Initial:192 lb  Current: 148 lb  EWL: 68%  Ronni:  Current BMI is Body mass index is 32 03 kg/m²      Tolerating a regular diet-fair- with abdominal pain intermittently  Eating at least 60 grams of protein per day-yes  Following 30/60 minute rule with liquids-yes  Drinking at least 64 ounces of fluid per day-yes  Drinking carbonated beverages-no  Sufficient exercise-limited  Using NSAIDs regularly-no  Using nicotine-no/some second-hand smoke exposure by significant other  Using alcohol-no    Postsurgical malabsorption  -At risk for malabsorption of vitamins/minerals secondary to malabsorption and restriction of intake from their procedure  -Currently taking adequate postop bariatric surgery vitamin supplementation-no  -Last set of bariatric labs completed on 4/17 and are not within acceptable limits   -Next set of bariatric labs ordered for approximately 2 weeks     She has been only taking one regular multivitamin supplement daily-advised on importance of adequate supplements to avoid deficiency and long-term associated medical problems    Recommended that she try one [employer] multivitamin/mineral supplement with 18 mg of iron and 500 mg calcium citrate with D three times per day      Secondary hyperparathyroidism ([facility] [address] )  pth has remained high -likely related to her non-compliance with supplements  Advised her of importance of taking all supplements as advised  She is already established with endocrinologist and notes she has appointment with Dr Amy Mathew later today  I  Will recheck her pth, calcium and Vitamin d with her routine labs now but advised I want her to review her pth levels with him today as she may need further evaluation of the gland as well  She verbalized understanding  I provided her with pth levels from last 3 readings to share with her endocrinologist     Hypertension  She is ordered on antihypertensive medication  Advised on importance of taking medication as prescribed by her providers  Diastolic was high normal today  Followed by pcp    Low vitamin B12 level  Last vitamin B12 level was low-advised to trial [employer] which contains extra vitamin B20-fvuk await labs for further advice       Diagnoses and all orders for this visit:    Bariatric surgery status    Postsurgical malabsorption    Secondary hyperparathyroidism ([facility] [address] )    Vitamin D deficiency    Obesity (BMI 30-39  9)    Low vitamin B12 level    Epigastric abdominal pain  -     sucralfate (CARAFATE) 1 g/10 mL suspension; Take 10 mL (1 g total) by mouth 4 (four) times a day    Essential hypertension          Subjective:      Patient ID: Ayleen Calixto is a 47 y o  female  She is here for routine annual visit  She is complaining of mid-epigastric burning pain worse with meals/intermittent  No associated fever,chills or nausea  She vomits "phelgm" at times  She is prone to constipation-on linzess now and notes stools tend to be loose now  She notes she has follow-up with Gastroenterologist in June as well          The following portions of the patient's history were reviewed and updated as appropriate: allergies, current medications, past family history, past medical history, past social history, past surgical history and problem list     Review of Systems   Constitutional: Negative for chills, fever and unexpected weight change (weight is stable)  Respiratory: Negative for shortness of breath and wheezing  Cardiovascular: Negative for chest pain and palpitations  Gastrointestinal: Negative for abdominal pain, constipation, diarrhea, nausea and vomiting  Psychiatric/Behavioral: Suicidal ideas: no complait of anxiety or depression  Objective:      /90   Pulse 78   Temp 98 2 °F (36 8 °C)   Ht 4' 9" (1 448 m)   Wt 67 1 kg (148 lb)   BMI 32 03 kg/m²          Physical Exam   Constitutional: She is oriented to person, place, and time  She appears well-developed and well-nourished  HENT:   Mouth/Throat: Oropharynx is clear and moist    Eyes: Conjunctivae are normal  No scleral icterus  Cardiovascular: Normal rate and regular rhythm  Pulmonary/Chest: Effort normal and breath sounds normal    Abdominal: Soft  Bowel sounds are normal  She exhibits no distension  There is tenderness  There is no rebound and no guarding  No incisional hernias appreciated  + tenderness to palpation to mid-epigastric region   Musculoskeletal:   Normal gait   Neurological: She is alert and oriented to person, place, and time  Psychiatric: She has a normal mood and affect  Her behavior is normal  Judgment and thought content normal    Nursing note and vitals reviewed  GOALS:Maintain +/- Continued weight loss with good nutrition intakes    Normal vitamin and mineral levels  Exercise as tolerated    BARRIERS: none identified

## 2018-06-14 ENCOUNTER — OFFICE VISIT (OUTPATIENT)
Dept: BARIATRICS | Facility: CLINIC | Age: 53
End: 2018-06-14
Payer: COMMERCIAL

## 2018-06-14 VITALS
HEART RATE: 74 BPM | SYSTOLIC BLOOD PRESSURE: 122 MMHG | DIASTOLIC BLOOD PRESSURE: 80 MMHG | WEIGHT: 145 LBS | HEIGHT: 59 IN | RESPIRATION RATE: 18 BRPM | BODY MASS INDEX: 29.23 KG/M2 | TEMPERATURE: 98 F

## 2018-06-14 DIAGNOSIS — K91.2 POSTSURGICAL MALABSORPTION: ICD-10-CM

## 2018-06-14 DIAGNOSIS — E66.9 OBESITY (BMI 30-39.9): Primary | ICD-10-CM

## 2018-06-14 DIAGNOSIS — N25.81 SECONDARY HYPERPARATHYROIDISM (HCC): ICD-10-CM

## 2018-06-14 DIAGNOSIS — Z98.84 BARIATRIC SURGERY STATUS: Primary | ICD-10-CM

## 2018-06-14 DIAGNOSIS — Z98.84 BARIATRIC SURGERY STATUS: ICD-10-CM

## 2018-06-14 PROCEDURE — 99213 OFFICE O/P EST LOW 20 MIN: CPT | Performed by: SURGERY

## 2018-07-30 ENCOUNTER — APPOINTMENT (OUTPATIENT)
Dept: LAB | Facility: HOSPITAL | Age: 53
End: 2018-07-30
Payer: COMMERCIAL

## 2018-07-30 DIAGNOSIS — K91.2 POSTSURGICAL MALABSORPTION: ICD-10-CM

## 2018-07-30 DIAGNOSIS — Z98.84 BARIATRIC SURGERY STATUS: ICD-10-CM

## 2018-07-30 DIAGNOSIS — N25.81 SECONDARY HYPERPARATHYROIDISM (HCC): ICD-10-CM

## 2018-07-30 LAB
25(OH)D3 SERPL-MCNC: 33.8 NG/ML (ref 30–100)
ALBUMIN SERPL BCP-MCNC: 3.7 G/DL (ref 3.5–5)
ALP SERPL-CCNC: 87 U/L (ref 46–116)
ALT SERPL W P-5'-P-CCNC: 23 U/L (ref 12–78)
ANION GAP SERPL CALCULATED.3IONS-SCNC: 7 MMOL/L (ref 4–13)
AST SERPL W P-5'-P-CCNC: 24 U/L (ref 5–45)
BILIRUB SERPL-MCNC: 0.32 MG/DL (ref 0.2–1)
BUN SERPL-MCNC: 15 MG/DL (ref 5–25)
CALCIUM SERPL-MCNC: 9.2 MG/DL (ref 8.3–10.1)
CHLORIDE SERPL-SCNC: 101 MMOL/L (ref 100–108)
CO2 SERPL-SCNC: 31 MMOL/L (ref 21–32)
CREAT SERPL-MCNC: 0.75 MG/DL (ref 0.6–1.3)
ERYTHROCYTE [DISTWIDTH] IN BLOOD BY AUTOMATED COUNT: 12.6 % (ref 11.6–15.1)
FERRITIN SERPL-MCNC: 14 NG/ML (ref 8–388)
FOLATE SERPL-MCNC: 16.6 NG/ML (ref 3.1–17.5)
GFR SERPL CREATININE-BSD FRML MDRD: 92 ML/MIN/1.73SQ M
GLUCOSE P FAST SERPL-MCNC: 109 MG/DL (ref 65–99)
HCT VFR BLD AUTO: 42.1 % (ref 34.8–46.1)
HGB BLD-MCNC: 13.9 G/DL (ref 11.5–15.4)
IRON SATN MFR SERPL: 20 %
IRON SERPL-MCNC: 77 UG/DL (ref 50–170)
MCH RBC QN AUTO: 28.2 PG (ref 26.8–34.3)
MCHC RBC AUTO-ENTMCNC: 33 G/DL (ref 31.4–37.4)
MCV RBC AUTO: 85 FL (ref 82–98)
PLATELET # BLD AUTO: 236 THOUSANDS/UL (ref 149–390)
PMV BLD AUTO: 10.7 FL (ref 8.9–12.7)
POTASSIUM SERPL-SCNC: 3.3 MMOL/L (ref 3.5–5.3)
PROT SERPL-MCNC: 7 G/DL (ref 6.4–8.2)
PTH-INTACT SERPL-MCNC: 179.5 PG/ML (ref 18.4–80.1)
RBC # BLD AUTO: 4.93 MILLION/UL (ref 3.81–5.12)
SODIUM SERPL-SCNC: 139 MMOL/L (ref 136–145)
TIBC SERPL-MCNC: 388 UG/DL (ref 250–450)
VIT B12 SERPL-MCNC: 187 PG/ML (ref 100–900)
WBC # BLD AUTO: 4.02 THOUSAND/UL (ref 4.31–10.16)

## 2018-07-30 PROCEDURE — 82746 ASSAY OF FOLIC ACID SERUM: CPT

## 2018-07-30 PROCEDURE — 84590 ASSAY OF VITAMIN A: CPT

## 2018-07-30 PROCEDURE — 85027 COMPLETE CBC AUTOMATED: CPT

## 2018-07-30 PROCEDURE — 36415 COLL VENOUS BLD VENIPUNCTURE: CPT

## 2018-07-30 PROCEDURE — 83540 ASSAY OF IRON: CPT

## 2018-07-30 PROCEDURE — 83970 ASSAY OF PARATHORMONE: CPT

## 2018-07-30 PROCEDURE — 82306 VITAMIN D 25 HYDROXY: CPT

## 2018-07-30 PROCEDURE — 83550 IRON BINDING TEST: CPT

## 2018-07-30 PROCEDURE — 84630 ASSAY OF ZINC: CPT

## 2018-07-30 PROCEDURE — 84425 ASSAY OF VITAMIN B-1: CPT

## 2018-07-30 PROCEDURE — 82525 ASSAY OF COPPER: CPT

## 2018-07-30 PROCEDURE — 82607 VITAMIN B-12: CPT

## 2018-07-30 PROCEDURE — 82728 ASSAY OF FERRITIN: CPT

## 2018-07-30 PROCEDURE — 80053 COMPREHEN METABOLIC PANEL: CPT

## 2018-08-01 LAB — ZINC SERPL-MCNC: 56 UG/DL (ref 56–134)

## 2018-08-02 LAB
COPPER SERPL-MCNC: 144 UG/DL (ref 72–166)
VIT A SERPL-MCNC: 18 UG/DL (ref 33.1–100)

## 2018-08-03 DIAGNOSIS — K91.2 POSTSURGICAL MALABSORPTION: ICD-10-CM

## 2018-08-03 DIAGNOSIS — E53.8 VITAMIN B12 DEFICIENCY: ICD-10-CM

## 2018-08-03 DIAGNOSIS — Z98.890 HISTORY OF GASTRIC SURGERY: ICD-10-CM

## 2018-08-03 DIAGNOSIS — E50.9 VITAMIN A DEFICIENCY: Primary | ICD-10-CM

## 2018-08-08 LAB — VIT B1 BLD-SCNC: 99.9 NMOL/L (ref 66.5–200)

## 2018-08-17 ENCOUNTER — TELEPHONE (OUTPATIENT)
Dept: BARIATRICS | Facility: CLINIC | Age: 53
End: 2018-08-17

## 2018-08-17 NOTE — TELEPHONE ENCOUNTER
Please let the patient know that her B1 was within the acceptable range  She should have received the recommendations for the remaining lab results from Loretta Lindsey  Thank you!

## 2018-10-23 ENCOUNTER — TRANSCRIBE ORDERS (OUTPATIENT)
Dept: ADMINISTRATIVE | Facility: HOSPITAL | Age: 53
End: 2018-10-23

## 2018-10-23 ENCOUNTER — APPOINTMENT (OUTPATIENT)
Dept: LAB | Facility: HOSPITAL | Age: 53
End: 2018-10-23
Payer: COMMERCIAL

## 2018-10-23 DIAGNOSIS — E66.09 EXOGENOUS OBESITY: Primary | ICD-10-CM

## 2018-10-23 DIAGNOSIS — E06.3 CHRONIC LYMPHOCYTIC THYROIDITIS: ICD-10-CM

## 2018-10-23 DIAGNOSIS — I51.9 MYXEDEMA HEART DISEASE: ICD-10-CM

## 2018-10-23 DIAGNOSIS — E55.9 AVITAMINOSIS D: ICD-10-CM

## 2018-10-23 DIAGNOSIS — Z98.84 BARIATRIC SURGERY STATUS: ICD-10-CM

## 2018-10-23 DIAGNOSIS — E03.9 MYXEDEMA HEART DISEASE: ICD-10-CM

## 2018-10-23 DIAGNOSIS — R13.10 PROBLEMS WITH SWALLOWING AND MASTICATION: ICD-10-CM

## 2018-10-23 DIAGNOSIS — E53.8 VITAMIN B12 DEFICIENCY: ICD-10-CM

## 2018-10-23 DIAGNOSIS — F32.0 MAJOR DEPRESSIVE DISORDER, SINGLE EPISODE, MILD (HCC): ICD-10-CM

## 2018-10-23 DIAGNOSIS — E50.9 VITAMIN A DEFICIENCY: ICD-10-CM

## 2018-10-23 DIAGNOSIS — Z79.899 ENCOUNTER FOR LONG-TERM (CURRENT) USE OF MEDICATIONS: ICD-10-CM

## 2018-10-23 DIAGNOSIS — K91.2 POSTSURGICAL MALABSORPTION: ICD-10-CM

## 2018-10-23 DIAGNOSIS — I10 ESSENTIAL HYPERTENSION, MALIGNANT: ICD-10-CM

## 2018-10-23 DIAGNOSIS — Z98.890 HISTORY OF GASTRIC SURGERY: ICD-10-CM

## 2018-10-23 DIAGNOSIS — E66.09 EXOGENOUS OBESITY: ICD-10-CM

## 2018-10-23 LAB
25(OH)D3 SERPL-MCNC: 59 NG/ML (ref 30–100)
ALBUMIN SERPL BCP-MCNC: 3.5 G/DL (ref 3.5–5)
ALP SERPL-CCNC: 87 U/L (ref 46–116)
ALT SERPL W P-5'-P-CCNC: 24 U/L (ref 12–78)
ANION GAP SERPL CALCULATED.3IONS-SCNC: 6 MMOL/L (ref 4–13)
AST SERPL W P-5'-P-CCNC: 23 U/L (ref 5–45)
BILIRUB SERPL-MCNC: 0.26 MG/DL (ref 0.2–1)
BUN SERPL-MCNC: 26 MG/DL (ref 5–25)
CALCIUM SERPL-MCNC: 9.2 MG/DL (ref 8.3–10.1)
CHLORIDE SERPL-SCNC: 103 MMOL/L (ref 100–108)
CHOLEST SERPL-MCNC: 212 MG/DL (ref 50–200)
CO2 SERPL-SCNC: 33 MMOL/L (ref 21–32)
CREAT SERPL-MCNC: 0.72 MG/DL (ref 0.6–1.3)
EST. AVERAGE GLUCOSE BLD GHB EST-MCNC: 137 MG/DL
GFR SERPL CREATININE-BSD FRML MDRD: 97 ML/MIN/1.73SQ M
GLUCOSE P FAST SERPL-MCNC: 123 MG/DL (ref 65–99)
HBA1C MFR BLD: 6.4 % (ref 4.2–6.3)
HDLC SERPL-MCNC: 101 MG/DL (ref 40–60)
LDLC SERPL CALC-MCNC: 98 MG/DL (ref 0–100)
NONHDLC SERPL-MCNC: 111 MG/DL
POTASSIUM SERPL-SCNC: 3 MMOL/L (ref 3.5–5.3)
PROT SERPL-MCNC: 6.9 G/DL (ref 6.4–8.2)
PTH-INTACT SERPL-MCNC: 116 PG/ML (ref 18.4–80.1)
SODIUM SERPL-SCNC: 142 MMOL/L (ref 136–145)
T4 FREE SERPL-MCNC: 1.04 NG/DL (ref 0.76–1.46)
TRIGL SERPL-MCNC: 65 MG/DL
TSH SERPL DL<=0.05 MIU/L-ACNC: 1.25 UIU/ML (ref 0.36–3.74)
VIT B12 SERPL-MCNC: 223 PG/ML (ref 100–900)

## 2018-10-23 PROCEDURE — 36415 COLL VENOUS BLD VENIPUNCTURE: CPT

## 2018-10-23 PROCEDURE — 84443 ASSAY THYROID STIM HORMONE: CPT

## 2018-10-23 PROCEDURE — 84590 ASSAY OF VITAMIN A: CPT

## 2018-10-23 PROCEDURE — 82306 VITAMIN D 25 HYDROXY: CPT

## 2018-10-23 PROCEDURE — 80061 LIPID PANEL: CPT

## 2018-10-23 PROCEDURE — 83036 HEMOGLOBIN GLYCOSYLATED A1C: CPT

## 2018-10-23 PROCEDURE — 80053 COMPREHEN METABOLIC PANEL: CPT

## 2018-10-23 PROCEDURE — 82607 VITAMIN B-12: CPT

## 2018-10-23 PROCEDURE — 84439 ASSAY OF FREE THYROXINE: CPT

## 2018-10-23 PROCEDURE — 83970 ASSAY OF PARATHORMONE: CPT

## 2018-10-26 LAB — VIT A SERPL-MCNC: 16.6 UG/DL (ref 33.1–100)

## 2018-11-12 DIAGNOSIS — K91.2 POSTSURGICAL MALABSORPTION: ICD-10-CM

## 2018-11-12 DIAGNOSIS — Z98.84 BARIATRIC SURGERY STATUS: ICD-10-CM

## 2018-11-12 DIAGNOSIS — E50.9 VITAMIN A DEFICIENCY: Primary | ICD-10-CM

## 2018-11-12 DIAGNOSIS — E53.8 LOW VITAMIN B12 LEVEL: ICD-10-CM

## 2021-01-03 NOTE — ASSESSMENT & PLAN NOTE
Last vitamin B12 level was low-advised to trial [employer] i which contains extra vitamin V67-nild await labs for further advice Walk in

## (undated) DEVICE — SINGLE-USE BIOPSY FORCEPS: Brand: RADIAL JAW 4

## (undated) DEVICE — FORCEP ELECSURG RADIAL JAW4 2.2 X 240CM  HOT BX